# Patient Record
Sex: MALE | Race: AMERICAN INDIAN OR ALASKA NATIVE | NOT HISPANIC OR LATINO
[De-identification: names, ages, dates, MRNs, and addresses within clinical notes are randomized per-mention and may not be internally consistent; named-entity substitution may affect disease eponyms.]

---
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
## 2021-01-10 ENCOUNTER — EMERGENCY (EMERGENCY)
Facility: HOSPITAL | Age: 21
LOS: 1 days | Discharge: HOME | End: 2021-01-10
Attending: INTERNAL MEDICINE | Admitting: INTERNAL MEDICINE
Payer: COMMERCIAL

## 2021-01-10 VITALS
RESPIRATION RATE: 15 BRPM | HEART RATE: 88 BPM | TEMPERATURE: 98 F | OXYGEN SATURATION: 100 % | SYSTOLIC BLOOD PRESSURE: 103 MMHG | DIASTOLIC BLOOD PRESSURE: 64 MMHG

## 2021-01-10 VITALS
HEIGHT: 73 IN | TEMPERATURE: 98 F | HEART RATE: 104 BPM | RESPIRATION RATE: 16 BRPM | DIASTOLIC BLOOD PRESSURE: 78 MMHG | WEIGHT: 220.9 LBS | OXYGEN SATURATION: 100 % | SYSTOLIC BLOOD PRESSURE: 130 MMHG

## 2021-01-10 LAB
ALBUMIN SERPL ELPH-MCNC: 4 G/DL — SIGNIFICANT CHANGE UP (ref 3.3–5)
ALP SERPL-CCNC: 66 U/L — SIGNIFICANT CHANGE UP (ref 40–120)
ALT FLD-CCNC: 28 U/L — SIGNIFICANT CHANGE UP (ref 12–78)
ANION GAP SERPL CALC-SCNC: 8 MMOL/L — SIGNIFICANT CHANGE UP (ref 5–17)
APPEARANCE UR: CLEAR — SIGNIFICANT CHANGE UP
AST SERPL-CCNC: 19 U/L — SIGNIFICANT CHANGE UP (ref 15–37)
BASOPHILS # BLD AUTO: 0.02 K/UL — SIGNIFICANT CHANGE UP (ref 0–0.2)
BASOPHILS NFR BLD AUTO: 0.2 % — SIGNIFICANT CHANGE UP (ref 0–2)
BILIRUB SERPL-MCNC: 0.4 MG/DL — SIGNIFICANT CHANGE UP (ref 0.2–1.2)
BILIRUB UR-MCNC: NEGATIVE — SIGNIFICANT CHANGE UP
BUN SERPL-MCNC: 11 MG/DL — SIGNIFICANT CHANGE UP (ref 7–23)
CALCIUM SERPL-MCNC: 8.9 MG/DL — SIGNIFICANT CHANGE UP (ref 8.5–10.1)
CHLORIDE SERPL-SCNC: 104 MMOL/L — SIGNIFICANT CHANGE UP (ref 96–108)
CO2 SERPL-SCNC: 28 MMOL/L — SIGNIFICANT CHANGE UP (ref 22–31)
COLOR SPEC: SIGNIFICANT CHANGE UP
CREAT SERPL-MCNC: 0.83 MG/DL — SIGNIFICANT CHANGE UP (ref 0.5–1.3)
DIFF PNL FLD: NEGATIVE — SIGNIFICANT CHANGE UP
EOSINOPHIL # BLD AUTO: 0.06 K/UL — SIGNIFICANT CHANGE UP (ref 0–0.5)
EOSINOPHIL NFR BLD AUTO: 0.7 % — SIGNIFICANT CHANGE UP (ref 0–6)
GLUCOSE SERPL-MCNC: 95 MG/DL — SIGNIFICANT CHANGE UP (ref 70–99)
GLUCOSE UR QL: NEGATIVE — SIGNIFICANT CHANGE UP
HCT VFR BLD CALC: 45.3 % — SIGNIFICANT CHANGE UP (ref 39–50)
HGB BLD-MCNC: 15.4 G/DL — SIGNIFICANT CHANGE UP (ref 13–17)
IMM GRANULOCYTES NFR BLD AUTO: 0.4 % — SIGNIFICANT CHANGE UP (ref 0–1.5)
KETONES UR-MCNC: NEGATIVE — SIGNIFICANT CHANGE UP
LEUKOCYTE ESTERASE UR-ACNC: NEGATIVE — SIGNIFICANT CHANGE UP
LYMPHOCYTES # BLD AUTO: 0.65 K/UL — LOW (ref 1–3.3)
LYMPHOCYTES # BLD AUTO: 7.9 % — LOW (ref 13–44)
MCHC RBC-ENTMCNC: 29.7 PG — SIGNIFICANT CHANGE UP (ref 27–34)
MCHC RBC-ENTMCNC: 34 GM/DL — SIGNIFICANT CHANGE UP (ref 32–36)
MCV RBC AUTO: 87.3 FL — SIGNIFICANT CHANGE UP (ref 80–100)
MONOCYTES # BLD AUTO: 0.3 K/UL — SIGNIFICANT CHANGE UP (ref 0–0.9)
MONOCYTES NFR BLD AUTO: 3.6 % — SIGNIFICANT CHANGE UP (ref 2–14)
NEUTROPHILS # BLD AUTO: 7.18 K/UL — SIGNIFICANT CHANGE UP (ref 1.8–7.4)
NEUTROPHILS NFR BLD AUTO: 87.2 % — HIGH (ref 43–77)
NITRITE UR-MCNC: NEGATIVE — SIGNIFICANT CHANGE UP
NRBC # BLD: 0 /100 WBCS — SIGNIFICANT CHANGE UP (ref 0–0)
PH UR: 7 — SIGNIFICANT CHANGE UP (ref 5–8)
PLATELET # BLD AUTO: 202 K/UL — SIGNIFICANT CHANGE UP (ref 150–400)
POTASSIUM SERPL-MCNC: 4.2 MMOL/L — SIGNIFICANT CHANGE UP (ref 3.5–5.3)
POTASSIUM SERPL-SCNC: 4.2 MMOL/L — SIGNIFICANT CHANGE UP (ref 3.5–5.3)
PROT SERPL-MCNC: 7.9 G/DL — SIGNIFICANT CHANGE UP (ref 6–8.3)
PROT UR-MCNC: NEGATIVE — SIGNIFICANT CHANGE UP
RBC # BLD: 5.19 M/UL — SIGNIFICANT CHANGE UP (ref 4.2–5.8)
RBC # FLD: 12.4 % — SIGNIFICANT CHANGE UP (ref 10.3–14.5)
SARS-COV-2 RNA SPEC QL NAA+PROBE: SIGNIFICANT CHANGE UP
SODIUM SERPL-SCNC: 140 MMOL/L — SIGNIFICANT CHANGE UP (ref 135–145)
SP GR SPEC: 1.01 — SIGNIFICANT CHANGE UP (ref 1.01–1.02)
TROPONIN I SERPL-MCNC: <.015 NG/ML — SIGNIFICANT CHANGE UP (ref 0.01–0.04)
UROBILINOGEN FLD QL: NEGATIVE — SIGNIFICANT CHANGE UP
WBC # BLD: 8.24 K/UL — SIGNIFICANT CHANGE UP (ref 3.8–10.5)
WBC # FLD AUTO: 8.24 K/UL — SIGNIFICANT CHANGE UP (ref 3.8–10.5)

## 2021-01-10 PROCEDURE — 85025 COMPLETE CBC W/AUTO DIFF WBC: CPT

## 2021-01-10 PROCEDURE — 80053 COMPREHEN METABOLIC PANEL: CPT

## 2021-01-10 PROCEDURE — 84484 ASSAY OF TROPONIN QUANT: CPT

## 2021-01-10 PROCEDURE — 99283 EMERGENCY DEPT VISIT LOW MDM: CPT | Mod: 25

## 2021-01-10 PROCEDURE — 71045 X-RAY EXAM CHEST 1 VIEW: CPT | Mod: 26

## 2021-01-10 PROCEDURE — 93010 ELECTROCARDIOGRAM REPORT: CPT

## 2021-01-10 PROCEDURE — 99284 EMERGENCY DEPT VISIT MOD MDM: CPT

## 2021-01-10 PROCEDURE — 81003 URINALYSIS AUTO W/O SCOPE: CPT

## 2021-01-10 PROCEDURE — 71045 X-RAY EXAM CHEST 1 VIEW: CPT

## 2021-01-10 PROCEDURE — 93005 ELECTROCARDIOGRAM TRACING: CPT

## 2021-01-10 PROCEDURE — U0003: CPT

## 2021-01-10 PROCEDURE — 36415 COLL VENOUS BLD VENIPUNCTURE: CPT

## 2021-01-10 PROCEDURE — U0005: CPT

## 2021-01-10 RX ORDER — SODIUM CHLORIDE 9 MG/ML
1000 INJECTION INTRAMUSCULAR; INTRAVENOUS; SUBCUTANEOUS ONCE
Refills: 0 | Status: COMPLETED | OUTPATIENT
Start: 2021-01-10 | End: 2021-01-10

## 2021-01-10 RX ADMIN — SODIUM CHLORIDE 1000 MILLILITER(S): 9 INJECTION INTRAMUSCULAR; INTRAVENOUS; SUBCUTANEOUS at 16:53

## 2021-01-10 NOTE — ED PROVIDER NOTE - CARDIAC, MLM
Normal rate, regular rhythm.  Heart sounds S1, S2.  ++ audible murmurs, rubs or gallops. + well healed midline chest scar

## 2021-01-10 NOTE — ED ADULT NURSE NOTE - PMH
Cardiomegaly    Coarctation of aorta    Congenital heart disease    Pulmonic regurgitation    Septal defect, atrial    Shone complex

## 2021-01-10 NOTE — ED PROVIDER NOTE - PROGRESS NOTE DETAILS
case d/w Dr. coulter at [facility]. case d/w Dr. coulter at [facility] @ [phone] case d/w Dr. coulter at [facility], [place] @ [phone]. Per Dr. Coulter, patient was seen and eval sept 2020. Patient exam at that time WNL.  has arranged follow up with cardio clinic this Wednesday for evaluation.   Plan and results d/w patient who reports improvement of symptoms and will follow up with cardio this Wednesday.

## 2021-01-10 NOTE — ED PROVIDER NOTE - PATIENT PORTAL LINK FT
You can access the FollowMyHealth Patient Portal offered by [facility] by registering at the following website: [email or web address]. By joining [employer]’s FollowMyHealth portal, you will also be able to view your health information using other applications (apps) compatible with our system.

## 2021-01-10 NOTE — ED PROVIDER NOTE - ATTENDING CONTRIBUTION TO CARE
21 yo male present to ED c/o one episode of weakness that began today and lasted 5 minutes. Per patient he did not sleep well, felt tired durign the day and then went to lay down and felt weakness that lasted several minutes. Patient now feels well. No HA, vision changes. No neck or throat pain, denies fever, chills. Denies CP or SOB, denies palpitations. Denies abdominal pain, n/v/c/d. Denies urinary symptoms. Patient has OMHX Shones complex s/p surgery in 2017. s/p interrupted aortic arch repair, s/p repair of IAA, s/p subaortic stenosis repair, s/p patch closure of VSD, s/p pulmonary valvuloplasty, left pulmonary artery stenosis, coarctation of aorta. Follows with Cardio Dr. Mckenzie.  Heent nl neck sup heart rrr s1s2 lungs clear abd soft nt ext nl neuro nonfocal Dx general weakness, transient episodes labs xray ekg all normal case discussed with the patients cardiologist plan DC and f/u appointment on Wednesday, Patient instructed to return for symptoms or any changes

## 2021-01-10 NOTE — ED PROVIDER NOTE - WR INTERPRETATION 1
CXR negative - No pneumothorax, No opacities, No free airCXR negative - No infiltrates, No consolidation, No atelectasis seenCXR negative - No CHF, No cardiomegaly, No pleural effusionsCXR negative - No tracheal deviation, No pneumothorax, No subdiaphragm

## 2021-01-10 NOTE — ED PROVIDER NOTE - OBJECTIVE STATEMENT
Shones compexs/p interupted aortic arch repair, s/p repair of IAA, s/p subarotic stenosis repair, s/p patch closure of VSD, s/p pulmonary valvulopasty, left pulmonary artery steosisk, coarctationof aorta ki harrell 21 yo male present to ED c/o one episode of weakness that began today and lasted 5 minutes. Per patient he did not sleep well, felt tired durign the day and then went to lay down and felt weakness that lasted several minutes. Patient now feels well. No HA, vision changes. No neck or throat pain, denies fever, chills. Denies CP or SOB, denies palpitations. Denies abdominal pain, n/v/c/d. Denies urinary symptoms. Patient has OMHX Shones complex s/p surgery in 2017. s/p interrupted aortic arch repair, s/p repair of IAA, s/p subaortic stenosis repair, s/p patch closure of VSD, s/p pulmonary valvuloplasty, left pulmonary artery stenosis, coarctation of aorta. FOllows with Cardio Dr. Mckenzie. 21 yo male present to ED c/o one episode of weakness that began today and lasted 5 minutes. Per patient he did not sleep well, felt tired durign the day and then went to lay down and felt weakness that lasted several minutes. Patient now feels well. No HA, vision changes. No neck or throat pain, denies fever, chills. Denies CP or SOB, denies palpitations. Denies abdominal pain, n/v/c/d. Denies urinary symptoms. Patient has OMHX Shones complex s/p surgery in 2017. s/p interrupted aortic arch repair, s/p repair of IAA, s/p subaortic stenosis repair, s/p patch closure of VSD, s/p pulmonary valvuloplasty, left pulmonary artery stenosis, coarctation of aorta. Follows with Cardio Dr. Mckenzie.

## 2021-01-10 NOTE — ED PROVIDER NOTE - NSFOLLOWUPINSTRUCTIONS_ED_ALL_ED_FT
Follow up in the cardiology clinic for additional care. You have an appt set up on Wednesday, 1/13/2021.   Return to ED if symptoms return, chest pain, shortness of breath or any other concerns.

## 2021-01-10 NOTE — ED ADULT TRIAGE NOTE - PATIENT ON (OXYGEN DELIVERY METHOD)
No new care gaps identified.  Powered by [employer]. Reference number: [id number]. 9/02/2020 8:49:35 AM CDT  
room air

## 2021-01-10 NOTE — ED PROVIDER NOTE - CLINICAL SUMMARY MEDICAL DECISION MAKING FREE TEXT BOX
20 male with extensive cardiac history, labs, ekg, 20 male with extensive cardiac history, labs, ekg, cardio

## 2021-01-10 NOTE — ED PROVIDER NOTE - MUSCULOSKELETAL NEGATIVE STATEMENT, MLM
no back pain, no gout, no musculoskeletal pain, no neck pain, and ++ generalized weakness, now resolved.

## 2021-04-01 ENCOUNTER — EMERGENCY (EMERGENCY)
Facility: HOSPITAL | Age: 21
LOS: 1 days | Discharge: HOME | End: 2021-04-01
Attending: EMERGENCY MEDICINE | Admitting: EMERGENCY MEDICINE
Payer: COMMERCIAL

## 2021-04-01 VITALS — RESPIRATION RATE: 16 BRPM | DIASTOLIC BLOOD PRESSURE: 73 MMHG | SYSTOLIC BLOOD PRESSURE: 102 MMHG | HEART RATE: 99 BPM

## 2021-04-01 VITALS
DIASTOLIC BLOOD PRESSURE: 78 MMHG | RESPIRATION RATE: 18 BRPM | HEART RATE: 104 BPM | HEIGHT: 73 IN | SYSTOLIC BLOOD PRESSURE: 131 MMHG | OXYGEN SATURATION: 100 % | WEIGHT: 229.94 LBS | TEMPERATURE: 97 F

## 2021-04-01 LAB
ALBUMIN SERPL ELPH-MCNC: 3.6 G/DL — SIGNIFICANT CHANGE UP (ref 3.3–5)
ALP SERPL-CCNC: 60 U/L — SIGNIFICANT CHANGE UP (ref 40–120)
ALT FLD-CCNC: 23 U/L — SIGNIFICANT CHANGE UP (ref 12–78)
ANION GAP SERPL CALC-SCNC: 5 MMOL/L — SIGNIFICANT CHANGE UP (ref 5–17)
APTT BLD: 35.3 SEC — SIGNIFICANT CHANGE UP (ref 27.5–35.5)
AST SERPL-CCNC: 10 U/L — LOW (ref 15–37)
BASOPHILS # BLD AUTO: 0.02 K/UL — SIGNIFICANT CHANGE UP (ref 0–0.2)
BASOPHILS NFR BLD AUTO: 0.3 % — SIGNIFICANT CHANGE UP (ref 0–2)
BILIRUB SERPL-MCNC: 0.3 MG/DL — SIGNIFICANT CHANGE UP (ref 0.2–1.2)
BUN SERPL-MCNC: 14 MG/DL — SIGNIFICANT CHANGE UP (ref 7–23)
CALCIUM SERPL-MCNC: 8.6 MG/DL — SIGNIFICANT CHANGE UP (ref 8.5–10.1)
CHLORIDE SERPL-SCNC: 105 MMOL/L — SIGNIFICANT CHANGE UP (ref 96–108)
CO2 SERPL-SCNC: 29 MMOL/L — SIGNIFICANT CHANGE UP (ref 22–31)
CREAT SERPL-MCNC: 0.82 MG/DL — SIGNIFICANT CHANGE UP (ref 0.5–1.3)
EOSINOPHIL # BLD AUTO: 0.18 K/UL — SIGNIFICANT CHANGE UP (ref 0–0.5)
EOSINOPHIL NFR BLD AUTO: 2.8 % — SIGNIFICANT CHANGE UP (ref 0–6)
GLUCOSE SERPL-MCNC: 90 MG/DL — SIGNIFICANT CHANGE UP (ref 70–99)
HCT VFR BLD CALC: 42.4 % — SIGNIFICANT CHANGE UP (ref 39–50)
HGB BLD-MCNC: 14.3 G/DL — SIGNIFICANT CHANGE UP (ref 13–17)
IMM GRANULOCYTES NFR BLD AUTO: 0.2 % — SIGNIFICANT CHANGE UP (ref 0–1.5)
INR BLD: 1.23 RATIO — HIGH (ref 0.88–1.16)
LYMPHOCYTES # BLD AUTO: 0.69 K/UL — LOW (ref 1–3.3)
LYMPHOCYTES # BLD AUTO: 10.7 % — LOW (ref 13–44)
MCHC RBC-ENTMCNC: 29.4 PG — SIGNIFICANT CHANGE UP (ref 27–34)
MCHC RBC-ENTMCNC: 33.7 GM/DL — SIGNIFICANT CHANGE UP (ref 32–36)
MCV RBC AUTO: 87.2 FL — SIGNIFICANT CHANGE UP (ref 80–100)
MONOCYTES # BLD AUTO: 0.4 K/UL — SIGNIFICANT CHANGE UP (ref 0–0.9)
MONOCYTES NFR BLD AUTO: 6.2 % — SIGNIFICANT CHANGE UP (ref 2–14)
NEUTROPHILS # BLD AUTO: 5.17 K/UL — SIGNIFICANT CHANGE UP (ref 1.8–7.4)
NEUTROPHILS NFR BLD AUTO: 79.8 % — HIGH (ref 43–77)
NRBC # BLD: 0 /100 WBCS — SIGNIFICANT CHANGE UP (ref 0–0)
PLATELET # BLD AUTO: 167 K/UL — SIGNIFICANT CHANGE UP (ref 150–400)
POTASSIUM SERPL-MCNC: 4 MMOL/L — SIGNIFICANT CHANGE UP (ref 3.5–5.3)
POTASSIUM SERPL-SCNC: 4 MMOL/L — SIGNIFICANT CHANGE UP (ref 3.5–5.3)
PROT SERPL-MCNC: 7.6 G/DL — SIGNIFICANT CHANGE UP (ref 6–8.3)
PROTHROM AB SERPL-ACNC: 14.3 SEC — HIGH (ref 10.6–13.6)
RBC # BLD: 4.86 M/UL — SIGNIFICANT CHANGE UP (ref 4.2–5.8)
RBC # FLD: 12.5 % — SIGNIFICANT CHANGE UP (ref 10.3–14.5)
SODIUM SERPL-SCNC: 139 MMOL/L — SIGNIFICANT CHANGE UP (ref 135–145)
TROPONIN I SERPL-MCNC: <.015 NG/ML — SIGNIFICANT CHANGE UP (ref 0.01–0.04)
TSH SERPL-MCNC: 0.64 UIU/ML — SIGNIFICANT CHANGE UP (ref 0.36–3.74)
WBC # BLD: 6.47 K/UL — SIGNIFICANT CHANGE UP (ref 3.8–10.5)
WBC # FLD AUTO: 6.47 K/UL — SIGNIFICANT CHANGE UP (ref 3.8–10.5)

## 2021-04-01 PROCEDURE — 93010 ELECTROCARDIOGRAM REPORT: CPT

## 2021-04-01 PROCEDURE — 84443 ASSAY THYROID STIM HORMONE: CPT

## 2021-04-01 PROCEDURE — 36415 COLL VENOUS BLD VENIPUNCTURE: CPT

## 2021-04-01 PROCEDURE — 80053 COMPREHEN METABOLIC PANEL: CPT

## 2021-04-01 PROCEDURE — 99284 EMERGENCY DEPT VISIT MOD MDM: CPT | Mod: 25

## 2021-04-01 PROCEDURE — 85025 COMPLETE CBC W/AUTO DIFF WBC: CPT

## 2021-04-01 PROCEDURE — 71045 X-RAY EXAM CHEST 1 VIEW: CPT | Mod: 26

## 2021-04-01 PROCEDURE — 71045 X-RAY EXAM CHEST 1 VIEW: CPT

## 2021-04-01 PROCEDURE — 99284 EMERGENCY DEPT VISIT MOD MDM: CPT

## 2021-04-01 PROCEDURE — 93005 ELECTROCARDIOGRAM TRACING: CPT

## 2021-04-01 PROCEDURE — 84484 ASSAY OF TROPONIN QUANT: CPT

## 2021-04-01 PROCEDURE — 85730 THROMBOPLASTIN TIME PARTIAL: CPT

## 2021-04-01 PROCEDURE — 99285 EMERGENCY DEPT VISIT HI MDM: CPT

## 2021-04-01 PROCEDURE — 85610 PROTHROMBIN TIME: CPT

## 2021-04-01 NOTE — ED ADULT NURSE NOTE - PMH
Cardiomegaly    Coarctation of aorta    Congenital heart disease    No pertinent past medical history    Pulmonic regurgitation    Septal defect, atrial    Shone complex

## 2021-04-01 NOTE — CONSULT NOTE ADULT - ASSESSMENT
Assessment/Plan:  19 y/o M with Hx Shone's Complex consisting of coarctation of aorta, ASD, PVR s/p uninterrupted repair of the aortic arch, s/p subaortic stenosis repair, s/p patch closure of VSD, and s/p pulmonary valvuloplasty as a  with a reop in  (unknown procedure) presented to the ED c/o palpitations which has been resolved.  Follows closely with his private Cardiologist from [facility], Dr. Mckenzie.  Had seen rose in February, had a TTE that was reportedly normal.  Denies having had any episode of palpitations in the past and is not on any AVN blocker.    Palpitations  - Felt palpitations this morning that happened at rest  - He has a known Hx of congenital heart disease (Shone's complex) and had extensive surgery with a reop in   - Follows closely with his private cardiologist in [facility] whom he had seen recently.  He has been asymptomatic except today, c/o palpitations, which has been resolved  - Please, obtain EKG and attach to bedside monitor while in ED  - If no evidence of tachycardia/AVNRT/Afib, can be discharged from cardiac standpoint and to follow up with Dr. Mckenzie    If admitted, will follow    Joanna Rodriguez DNP, NP-C  Cardiology  Spectra #[phone]       Assessment/Plan:  21 y/o M with Hx Shone's Complex consisting of coarctation of aorta, ASD, PVR s/p uninterrupted repair of the aortic arch, s/p subaortic stenosis repair, s/p patch closure of VSD, and s/p pulmonary valvuloplasty as a  with a reop in  (unknown procedure) presented to the ED c/o palpitations which has been resolved.  Follows closely with his private Cardiologist from [facility], Dr. Mckenzie.  Had seen rose in February, had a TTE that was reportedly normal.  Denies having had any episode of palpitations in the past and is not on any AVN blocker.    Palpitations  - Felt palpitations this morning that happened at rest  - He has a known Hx of congenital heart disease (Shone's complex) and had extensive surgery with a reop in   - Follows closely with his private cardiologist in [facility] whom he had seen recently.  He has been asymptomatic except today, c/o palpitations, which has been resolved  - Please, obtain EKG and attach to bedside monitor while in ED  - Add TSH with labs  - If no evidence of tachycardia/AVNRT/Afib, can be discharged from cardiac standpoint and to follow up with Dr. Mckenzie    If admitted, will follow    Joanna Rodriguez DNP, NP-C  Cardiology  Spectra #[phone]

## 2021-04-01 NOTE — ED PROVIDER NOTE - PATIENT PORTAL LINK FT
You can access the FollowMyHealth Patient Portal offered by [facility] by registering at the following website: [email or web address]. By joining [employer]’s FollowMyHealth portal, you will also be able to view your health information using other applications (apps) compatible with our system.

## 2021-04-01 NOTE — CONSULT NOTE ADULT - ATTENDING COMMENTS
I personally saw and examined the patient in detail.  I have spoken to the above provider regarding the assessment and plan of care.  I reviewed the above assessment and plan of care, and agree.  I have made changes in the body of the note where appropriate.  Monitor on telemetry two hours in ED.  If no arrhythmia, to follow as outpatient for Zio patch.

## 2021-04-01 NOTE — ED PROVIDER NOTE - ATTENDING CONTRIBUTION TO CARE
19 yo M with childhood cardiac issues (Cardiomegaly, coartication aorta) p/w palpitations today. no chesty pain. no sob. no numb/ting/focal weak. no neck / back pain. no numb/ting/focal weak. no agg/allev factors. no recent trauma. no recent illness. no other acute co.  No known covid exposure, no immuno / prior infxn.  exam: MM Moist. neck supple. no meningeal signs. abd soft NT. no hsm. no cvat. non-toxic, well appearing. Nl cardiac / resp exam. no c/c/e. no other acute findings  check labs, xr, dimer, cardio

## 2021-04-01 NOTE — CONSULT NOTE ADULT - SUBJECTIVE AND OBJECTIVE BOX
[facility] - Madison Santana, Tess Owens, Julius, Fady, Estrella, Oumou  Office Number: [phone]    Initial Consult Note:     CHIEF COMPLAINT: Patient is a 20y old  Male who presents with a chief complaint of     HPI:  This is a 21 y/o M with Hx Shone's Complex consisting of coarctation of aorta, ASD, PVR s/p uninterrupted repair of the aortic arch, s/p subaortic stenosis repair, s/p patch closure of VSD, and s/p pulmonary valvuloplasty presented to the ED c/p palpitation lasting     PAST MEDICAL & SURGICAL HISTORY:  No pertinent past medical history    Cardiomegaly    Pulmonic regurgitation    Congenital heart disease    Shone complex    Septal defect, atrial    Coarctation of aorta    SOCIAL HISTORY:  No tobacco, ethanol, or drug abuse.  FAMILY HISTORY:    No family history of acute MI or sudden cardiac death.  MEDICATIONS  (STANDING):    MEDICATIONS  (PRN):    Allergies    Augmentin (Rash)  Augmentin (Unknown)    Intolerances    REVIEW OF SYSTEMS:  CONSTITUTIONAL: No weakness, fevers or chills  EYES/ENT: No visual changes;  No vertigo or throat pain   NECK: No pain or stiffness  RESPIRATORY: No cough, wheezing, hemoptysis; No shortness of breath  CARDIOVASCULAR: No chest pain or palpitations  GASTROINTESTINAL: No abdominal pain. No nausea, vomiting, or hematemesis; No diarrhea or constipation. No melena or hematochezia.  GENITOURINARY: No dysuria, frequency or hematuria  NEUROLOGICAL: No numbness or weakness  SKIN: No itching or rash  All other review of systems is negative unless indicated above  VITAL SIGNS:   Vital Signs Last 24 Hrs  T(C): 36.3 (01 Apr 2021 11:47), Max: 36.3 (01 Apr 2021 11:47)  T(F): 97.4 (01 Apr 2021 11:47), Max: 97.4 (01 Apr 2021 11:47)  HR: 104 (01 Apr 2021 11:47) (104 - 104)  BP: 131/78 (01 Apr 2021 11:47) (131/78 - 131/78)  BP(mean): --  RR: 18 (01 Apr 2021 11:47) (18 - 18)  SpO2: 100% (01 Apr 2021 11:47) (100% - 100%)  I&O's Summary    On Exam:  Constitutional: NAD, alert and oriented x 3  Lungs:  Non-labored, breath sounds are clear bilaterally, No wheezing, rales or rhonchi  Cardiovascular: RRR.  S1 and S2 positive.  No murmurs, rubs, gallops or clicks  Gastrointestinal: Bowel Sounds present, soft, nontender.   Lymph: No peripheral edema. No cervical lymphadenopathy.  Neurological: Alert, no focal deficits  Skin: No rashes or ulcers   Psych:  Mood & affect appropriate.    LABS: All Labs Reviewed:                        14.3   6.47  )-----------( 167      ( 01 Apr 2021 13:02 )             42.4       EXAM:  XR CHEST PORTABLE URGENT 1V                          PROCEDURE DATE:  01/10/2021      INTERPRETATION:  AP semierect chest on January 10, 2021 at 4:46 PM. Patient has weakness in an extensive cardiac history.    COMPARISON: None available. Heart is normal for projection.    Sternotomy is noted.    The lung fields and pleural surfaces are unremarkable.    IMPRESSION: Sternotomy.            MAKEDA BEAUCHAMP M.D., ATTENDING RADIOLOGIST  This document has been electronically signed. Jan 112021  8:10AM              Blood Culture:         RADIOLOGY:    EKG:    Assessment/Plan:  20y Male    Joanna Jennifer DNP, ANP-C  Cardiology  Spectra #[phone]       [facility] - Madison Santana, Tess Owens, Julius, Fady, Estrella, Oumou  Office Number: [phone]    Initial Consult Note: 21 y/o M with Hx Shone's Complex consisting of coarctation of aorta, ASD, PVR s/p uninterrupted repair of the aortic arch, s/p subaortic stenosis repair, s/p patch closure of VSD, and s/p pulmonary valvuloplasty as a  with a reop in  (unknown procedure) presented to the ED c/o palpitations which has been resolved.    CHIEF COMPLAINT: Patient is a 20y old  Male who presents with a chief complaint of     HPI:  This is a 21 y/o M with Hx Shone's Complex consisting of coarctation of aorta, ASD, PVR s/p uninterrupted repair of the aortic arch, s/p subaortic stenosis repair, s/p patch closure of VSD, and s/p pulmonary valvuloplasty as a  with a reop in  (unknown procedure) presented to the ED c/o palpitations lasting 2-4 mins for at least 4 x since this morning after drinking tea.  Denies dizziness, lightheadedness, SOB, MARQUEZ, orthopnea, edema, chest pain.  Denies change in appetite, admits to be hydrating himself well.  Denies fevers, chills, diarrhea, sick contact , or recent travel.    Follows closely with his private Cardiologist from [facility], Dr. Mckenzie.  Had seen rose in February, had a TTE that was reportedly normal.  Denies having had any episode of palpitations in the past and is not on any AVN blocker.  On evaluation, patient is completely asymptomatic.  Available labs are unremarkable.  EKG pending.  Chest Xray negative of any acute findings    PAST MEDICAL & SURGICAL HISTORY:  No pertinent past medical history    Cardiomegaly    Pulmonic regurgitation    Congenital heart disease    Shone complex    Septal defect, atrial    Coarctation of aorta    SOCIAL HISTORY:  No tobacco, ethanol, or drug abuse.  FAMILY HISTORY:    No family history of acute MI or sudden cardiac death.  MEDICATIONS  (STANDING):    MEDICATIONS  (PRN):    Allergies    Augmentin (Rash)  Augmentin (Unknown)    Intolerances    REVIEW OF SYSTEMS:  CONSTITUTIONAL: No weakness, fevers or chills  EYES/ENT: No visual changes;  No vertigo or throat pain   NECK: No pain or stiffness  RESPIRATORY: No cough, wheezing, hemoptysis; No shortness of breath  CARDIOVASCULAR: No chest pain + palpitations  GASTROINTESTINAL: No abdominal pain. No nausea, vomiting, or hematemesis; No diarrhea or constipation. No melena or hematochezia.  GENITOURINARY: No dysuria, frequency or hematuria  NEUROLOGICAL: No numbness or weakness  SKIN: No itching or rash  All other review of systems is negative unless indicated above  VITAL SIGNS:   Vital Signs Last 24 Hrs  T(C): 36.3 (2021 11:47), Max: 36.3 (2021 11:47)  T(F): 97.4 (2021 11:47), Max: 97.4 (2021 11:47)  HR: 104 (2021 11:47) (104 - 104)  BP: 131/78 (2021 11:47) (131/78 - 131/78)  BP(mean): --  RR: 18 (2021 11:47) (18 - 18)  SpO2: 100% (2021 11:47) (100% - 100%)  I&O's Summary    On Exam:  Constitutional: NAD, alert and oriented x 3  Lungs:  Non-labored, breath sounds are clear bilaterally, No wheezing, rales or rhonchi  Cardiovascular: RRR.  S1 and S2 positive.  + murmurs, no rubs, gallops or clicks  Gastrointestinal: Bowel Sounds present, soft, nontender.   Lymph: No peripheral edema. No cervical lymphadenopathy.  Neurological: Alert, no focal deficits  Skin: No rashes or ulcers   Psych:  Mood & affect appropriate.    LABS: All Labs Reviewed:                        14.3   6.47  )-----------( 167      ( 2021 13:02 )             42.4     RADIOLOGY:  EXAM:  XR CHEST PORTABLE URGENT 1V                          PROCEDURE DATE:  01/10/2021      INTERPRETATION:  AP semierect chest on January 10, 2021 at 4:46 PM. Patient has weakness in an extensive cardiac history.    COMPARISON: None available. Heart is normal for projection.    Sternotomy is noted.    The lung fields and pleural surfaces are unremarkable.    IMPRESSION: Sternotomy.    MAKEDA D WECK M.D., ATTENDING RADIOLOGIST  This document has been electronically signed. 1120  8:10A    EKG:

## 2021-04-01 NOTE — ED ADULT NURSE NOTE - OBJECTIVE STATEMENT
pt to er states he had palpitations today denies pain is alert oriented speech clear denies sob denies fever skin intact warm and dry

## 2021-04-01 NOTE — ED PROVIDER NOTE - PROGRESS NOTE DETAILS
Spoke with cardiologist, Dr. Madrid, will see pt in ED for consult Pt seen by Dr. Madrid in ED, advised to monitor pt in ED and if no tachycardia/AVNRT/Afib and be discharged home for f/u with cards for holter monitor Pt in NAD in ED, pt asymptomatic, has not any symptoms of palpitations while in ED, heart rate in low 90s, no other symptoms. pt comfortable and eating lunch in ED. Will d/c home and f/u cardiologist.  Reevaluated patient at bedside.  Patient feeling much improved.  Discussed the results of all diagnostic testing in ED and copies of all reports given.   An opportunity to ask questions was given.  Discussed the importance of prompt, close medical follow-up.  Patient will return with any changes, concerns or persistent / worsening symptoms.  Understanding of all instructions verbalized.

## 2021-04-01 NOTE — ED PROVIDER NOTE - CPE EDP EYES NORM
Kelly Figueroa is a 65 y.o. female.     Pt is here to follow up depression and obesity  Weaned off effexor and started trintellix  Doesn't seem to have helped weight  Mood seems to be doing fine on trintellix, though  And not having any trouble with adverse effects  She does note that she often comfort eats  Eating even when she's not hungry  And often gets fatigued of diet plans    No chest pain, soa  No abd pain, n/v/d  No fever, cough         The following portions of the patient's history were reviewed and updated as appropriate: allergies, current medications, past family history, past medical history, past social history, past surgical history and problem list.    Review of Systems   Constitutional: Positive for fatigue. Negative for fever.   HENT: Negative for congestion, ear pain, rhinorrhea and sore throat.    Eyes: Negative for blurred vision and itching.   Respiratory: Negative for cough and shortness of breath.    Cardiovascular: Negative for chest pain and palpitations.   Gastrointestinal: Negative for abdominal pain, diarrhea and vomiting.   Endocrine: Negative for polydipsia and polyuria.   Genitourinary: Negative for dysuria, frequency, hematuria and urgency.   Musculoskeletal: Positive for arthralgias and myalgias. Negative for joint swelling.   Skin: Negative for rash and skin lesions.   Neurological: Negative for dizziness, numbness and headache.   Psychiatric/Behavioral: Positive for depressed mood and stress. The patient is nervous/anxious.          Current Outpatient Medications:   •  amLODIPine (NORVASC) 5 MG tablet, TAKE 1 TABLET BY MOUTH DAILY, Disp: 90 tablet, Rfl: 1  •  aspirin 81 MG EC tablet, Take 81 mg by mouth Daily., Disp: , Rfl:   •  Biotin 1000 MCG tablet, Take 1,000 mcg by mouth Daily., Disp: , Rfl:   •  calcium (OS-SONNY) 600 MG tablet, Take 1,200 mg by mouth Daily., Disp: , Rfl:   •  CRANBERRY PO, Take 1 tablet by mouth Daily., Disp: , Rfl:   •  levothyroxine  "(SYNTHROID, LEVOTHROID) 112 MCG tablet, TAKE 1 TABLET BY MOUTH DAILY, Disp: 90 tablet, Rfl: 1  •  Multiple Vitamins-Minerals (DAILY MULTIVITAMIN) capsule, Take 1 tablet by mouth Daily., Disp: , Rfl:   •  Omega-3 Fatty Acids ([employer] FISH OIL) 1200 MG capsule, Take 2,400 mg by mouth Daily., Disp: , Rfl:   •  polyethylene glycol (MIRALAX) packet, Take 17 g by mouth Daily., Disp: , Rfl:   •  Probiotic Product ([employer] PROBIOTIC PEARLS EX ST) capsule, Take 1 tablet by mouth Daily. Pt to stop taking on 2/3 for surgery, Disp: , Rfl:   •  vitamin C (ASCORBIC ACID) 500 MG tablet, Take 1,000 mg by mouth Daily. Do not take after 2/3 for surgery, Disp: , Rfl:   •  vitamin d (DIALYVITE VITAMIN D 5000) 5000 units capsule, Take 1 tablet by mouth Daily., Disp: , Rfl:     Objective   /78 (BP Location: Left arm, Patient Position: Sitting, Cuff Size: Large Adult)   Pulse 85   Temp 97.7 °F (36.5 °C) (Temporal)   Resp 16   Ht 160 cm (63\")   Wt 89.6 kg (197 lb 9.6 oz)   SpO2 100%   Breastfeeding No   BMI 35.00 kg/m²   Physical Exam  Vitals signs reviewed.   Constitutional:       General: She is not in acute distress.     Appearance: She is well-developed. She is not diaphoretic.   HENT:      Head: Normocephalic and atraumatic.      Right Ear: External ear normal.      Left Ear: External ear normal.      Nose: Nose normal.      Mouth/Throat:      Pharynx: No oropharyngeal exudate.   Eyes:      General: No scleral icterus.        Right eye: No discharge.         Left eye: No discharge.      Conjunctiva/sclera: Conjunctivae normal.   Neck:      Musculoskeletal: Normal range of motion and neck supple.      Thyroid: No thyromegaly.   Cardiovascular:      Rate and Rhythm: Normal rate and regular rhythm.      Heart sounds: Normal heart sounds. No murmur. No friction rub. No gallop.    Pulmonary:      Effort: Pulmonary effort is normal. No respiratory distress.      Breath sounds: Normal breath sounds. No wheezing or rales. "   Musculoskeletal: Normal range of motion.         General: No deformity.   Lymphadenopathy:      Cervical: No cervical adenopathy.   Skin:     General: Skin is warm and dry.      Findings: No erythema or rash.   Neurological:      Mental Status: She is alert and oriented to person, place, and time.      Cranial Nerves: No cranial nerve deficit.   Psychiatric:         Behavior: Behavior normal.         Thought Content: Thought content normal.           Assessment/Plan   Problems Addressed this Visit        Nervous and Auditory    Arthralgia       Other    Depression    Relevant Medications    phentermine (ADIPEX-P) 37.5 MG tablet    venlafaxine XR (Effexor XR) 75 MG 24 hr capsule      Other Visit Diagnoses     Class 2 obesity without serious comorbidity with body mass index (BMI) of 35.0 to 35.9 in adult, unspecified obesity type    -  Primary    Relevant Medications    phentermine (ADIPEX-P) 37.5 MG tablet    Myalgia          Diagnoses       Codes Comments    Class 2 obesity without serious comorbidity with body mass index (BMI) of 35.0 to 35.9 in adult, unspecified obesity type    -  Primary ICD-10-CM: E66.9, Z68.35  ICD-9-CM: 278.00, V85.35     Recurrent major depressive disorder, in partial remission (CMS/[facility])     ICD-10-CM: F33.41  ICD-9-CM: 296.35     Arthralgia, unspecified joint     ICD-10-CM: M25.50  ICD-9-CM: 719.40     Myalgia     ICD-10-CM: M79.10  ICD-9-CM: 729.1           Dc trintellix since it doesn't seem to help more than effexor, isn't improving weight, and would be more costly  Restart effexor but at 75mg  She's been having more joint and muscle pain - perhaps from fibromyalgia or perhaps from weight gain  effexor may have been helping if it were fibro  Start phentermine  rtc 4-6 weeks to follow up on weight and mood and arthralgias/myalgias         Procedures   normal...

## 2021-04-01 NOTE — ED PROVIDER NOTE - OBJECTIVE STATEMENT
19 y/o M with hx of coarctation of aorta, atrial septal defect, pulmonary regurgitation, cardiomegaly, congential heart disease presents with c/o palpitations x today. States that he has intermittent palpitations since this morning. States that palpitations lasts 2 seconds and then goes back to normal, last felt palpitations 10 minutes ago. Denies any symptoms at this time. Pt denies fever, chest pain, shortness of breath, irregular heart beat, leg pain/swelling, dizziness, N/V, diaphoresis, cough, recent travel, sick contacts or other symptoms.  cards in [place] 21 y/o M with hx of coarctation of aorta, atrial septal defect, pulmonary regurgitation, cardiomegaly, congential heart disease presents with c/o palpitations x today. States that he has intermittent palpitations since this morning. States that palpitations lasts ~2-4 minutes and then goes back to normal, feels improved with rest, last felt palpitations 10 minutes ago. Took a dose of non-drowsy claritin this am for allergies. Denies any symptoms at this time. Pt denies fever, chest pain, shortness of breath, irregular heart beat, leg pain/swelling, dizziness, N/V, diaphoresis, cough, recent travel, sick contacts, caffeine intake or other symptoms.  cardiologist in [facility] 19 y/o M with hx of shone's complex with coarctation of aorta, atrial septal defect, pulmonary regurgitation, cardiomegaly, congential heart disease presents with c/o palpitations x today. States that he has intermittent palpitations since this morning. States that palpitations lasts ~2-4 minutes and then goes back to normal, feels improved with rest, last felt palpitations 10 minutes ago. Took a dose of non-drowsy claritin this am for allergies. Denies any symptoms at this time. States that he saw his cardiologist in February this year and had a TTE. Pt denies fever, chest pain, shortness of breath, irregular heart beat, leg pain/swelling, dizziness, N/V, diaphoresis, cough, recent travel, sick contacts, caffeine intake or other symptoms.  cardiologist in [facility]

## 2021-04-01 NOTE — ED PROVIDER NOTE - CLINICAL SUMMARY MEDICAL DECISION MAKING FREE TEXT BOX
21 yo M with hx of cardiomegaly, congenital heart disease, coarctation of aorta, pulmonary regurgitation c/o intermittent palpitations since this am, no cp/sob/fever/dizziness/n/v/diaphoresis/abdominal pain; asymptomatic at this time in ED, will get ekg, labs, cards consult, reassess

## 2021-04-01 NOTE — ED PROVIDER NOTE - CARE PROVIDER_API CALL
Tee Madrid)  Cardio [facility]  [address] [place] [address]  Phone: [phone]  Fax: [phone]  Follow Up Time: 1-3 Days

## 2021-04-02 PROBLEM — I51.7 CARDIOMEGALY: Chronic | Status: ACTIVE | Noted: 2021-01-10

## 2021-04-02 PROBLEM — Q24.9 CONGENITAL MALFORMATION OF HEART, UNSPECIFIED: Chronic | Status: ACTIVE | Noted: 2021-01-10

## 2021-04-02 PROBLEM — Q21.1 ATRIAL SEPTAL DEFECT: Chronic | Status: ACTIVE | Noted: 2021-01-10

## 2021-04-02 PROBLEM — Q25.1 COARCTATION OF AORTA: Chronic | Status: ACTIVE | Noted: 2021-01-10

## 2021-04-02 PROBLEM — I37.1 NONRHEUMATIC PULMONARY VALVE INSUFFICIENCY: Chronic | Status: ACTIVE | Noted: 2021-01-10

## 2021-04-16 ENCOUNTER — EMERGENCY (EMERGENCY)
Facility: HOSPITAL | Age: 21
LOS: 1 days | Discharge: HOME | End: 2021-04-16
Attending: EMERGENCY MEDICINE | Admitting: EMERGENCY MEDICINE
Payer: COMMERCIAL

## 2021-04-16 VITALS
DIASTOLIC BLOOD PRESSURE: 61 MMHG | SYSTOLIC BLOOD PRESSURE: 122 MMHG | HEART RATE: 96 BPM | OXYGEN SATURATION: 100 % | RESPIRATION RATE: 18 BRPM | TEMPERATURE: 99 F

## 2021-04-16 VITALS
WEIGHT: 179.9 LBS | HEART RATE: 97 BPM | OXYGEN SATURATION: 96 % | SYSTOLIC BLOOD PRESSURE: 111 MMHG | RESPIRATION RATE: 18 BRPM | TEMPERATURE: 98 F | DIASTOLIC BLOOD PRESSURE: 78 MMHG | HEIGHT: 73 IN

## 2021-04-16 LAB
ALBUMIN SERPL ELPH-MCNC: 3.9 G/DL — SIGNIFICANT CHANGE UP (ref 3.3–5)
ALP SERPL-CCNC: 64 U/L — SIGNIFICANT CHANGE UP (ref 40–120)
ALT FLD-CCNC: 16 U/L — SIGNIFICANT CHANGE UP (ref 12–78)
ANION GAP SERPL CALC-SCNC: 6 MMOL/L — SIGNIFICANT CHANGE UP (ref 5–17)
APTT BLD: 36.8 SEC — HIGH (ref 27.5–35.5)
AST SERPL-CCNC: 12 U/L — LOW (ref 15–37)
BASOPHILS # BLD AUTO: 0.01 K/UL — SIGNIFICANT CHANGE UP (ref 0–0.2)
BASOPHILS NFR BLD AUTO: 0.1 % — SIGNIFICANT CHANGE UP (ref 0–2)
BILIRUB SERPL-MCNC: 0.4 MG/DL — SIGNIFICANT CHANGE UP (ref 0.2–1.2)
BUN SERPL-MCNC: 14 MG/DL — SIGNIFICANT CHANGE UP (ref 7–23)
CALCIUM SERPL-MCNC: 8.7 MG/DL — SIGNIFICANT CHANGE UP (ref 8.5–10.1)
CHLORIDE SERPL-SCNC: 106 MMOL/L — SIGNIFICANT CHANGE UP (ref 96–108)
CO2 SERPL-SCNC: 28 MMOL/L — SIGNIFICANT CHANGE UP (ref 22–31)
CREAT SERPL-MCNC: 0.91 MG/DL — SIGNIFICANT CHANGE UP (ref 0.5–1.3)
D DIMER BLD IA.RAPID-MCNC: <150 NG/ML DDU — SIGNIFICANT CHANGE UP
EOSINOPHIL # BLD AUTO: 0.17 K/UL — SIGNIFICANT CHANGE UP (ref 0–0.5)
EOSINOPHIL NFR BLD AUTO: 2.4 % — SIGNIFICANT CHANGE UP (ref 0–6)
GLUCOSE SERPL-MCNC: 88 MG/DL — SIGNIFICANT CHANGE UP (ref 70–99)
HCT VFR BLD CALC: 45 % — SIGNIFICANT CHANGE UP (ref 39–50)
HGB BLD-MCNC: 15.4 G/DL — SIGNIFICANT CHANGE UP (ref 13–17)
IMM GRANULOCYTES NFR BLD AUTO: 0.3 % — SIGNIFICANT CHANGE UP (ref 0–1.5)
INR BLD: 1.33 RATIO — HIGH (ref 0.88–1.16)
LYMPHOCYTES # BLD AUTO: 0.62 K/UL — LOW (ref 1–3.3)
LYMPHOCYTES # BLD AUTO: 8.7 % — LOW (ref 13–44)
MAGNESIUM SERPL-MCNC: 1.9 MG/DL — SIGNIFICANT CHANGE UP (ref 1.6–2.6)
MCHC RBC-ENTMCNC: 29.7 PG — SIGNIFICANT CHANGE UP (ref 27–34)
MCHC RBC-ENTMCNC: 34.2 GM/DL — SIGNIFICANT CHANGE UP (ref 32–36)
MCV RBC AUTO: 86.7 FL — SIGNIFICANT CHANGE UP (ref 80–100)
MONOCYTES # BLD AUTO: 0.38 K/UL — SIGNIFICANT CHANGE UP (ref 0–0.9)
MONOCYTES NFR BLD AUTO: 5.3 % — SIGNIFICANT CHANGE UP (ref 2–14)
NEUTROPHILS # BLD AUTO: 5.91 K/UL — SIGNIFICANT CHANGE UP (ref 1.8–7.4)
NEUTROPHILS NFR BLD AUTO: 83.2 % — HIGH (ref 43–77)
NRBC # BLD: 0 /100 WBCS — SIGNIFICANT CHANGE UP (ref 0–0)
NT-PROBNP SERPL-SCNC: 24 PG/ML — SIGNIFICANT CHANGE UP (ref 0–125)
PLATELET # BLD AUTO: 184 K/UL — SIGNIFICANT CHANGE UP (ref 150–400)
POTASSIUM SERPL-MCNC: 3.8 MMOL/L — SIGNIFICANT CHANGE UP (ref 3.5–5.3)
POTASSIUM SERPL-SCNC: 3.8 MMOL/L — SIGNIFICANT CHANGE UP (ref 3.5–5.3)
PROT SERPL-MCNC: 7.9 G/DL — SIGNIFICANT CHANGE UP (ref 6–8.3)
PROTHROM AB SERPL-ACNC: 15.4 SEC — HIGH (ref 10.6–13.6)
RBC # BLD: 5.19 M/UL — SIGNIFICANT CHANGE UP (ref 4.2–5.8)
RBC # FLD: 12.7 % — SIGNIFICANT CHANGE UP (ref 10.3–14.5)
SODIUM SERPL-SCNC: 140 MMOL/L — SIGNIFICANT CHANGE UP (ref 135–145)
TROPONIN I SERPL-MCNC: <.015 NG/ML — SIGNIFICANT CHANGE UP (ref 0.01–0.04)
TSH SERPL-MCNC: 0.46 UIU/ML — SIGNIFICANT CHANGE UP (ref 0.36–3.74)
WBC # BLD: 7.11 K/UL — SIGNIFICANT CHANGE UP (ref 3.8–10.5)
WBC # FLD AUTO: 7.11 K/UL — SIGNIFICANT CHANGE UP (ref 3.8–10.5)

## 2021-04-16 PROCEDURE — 85730 THROMBOPLASTIN TIME PARTIAL: CPT

## 2021-04-16 PROCEDURE — 99285 EMERGENCY DEPT VISIT HI MDM: CPT

## 2021-04-16 PROCEDURE — 93010 ELECTROCARDIOGRAM REPORT: CPT

## 2021-04-16 PROCEDURE — 71046 X-RAY EXAM CHEST 2 VIEWS: CPT

## 2021-04-16 PROCEDURE — 80053 COMPREHEN METABOLIC PANEL: CPT

## 2021-04-16 PROCEDURE — 85610 PROTHROMBIN TIME: CPT

## 2021-04-16 PROCEDURE — 85025 COMPLETE CBC W/AUTO DIFF WBC: CPT

## 2021-04-16 PROCEDURE — 85379 FIBRIN DEGRADATION QUANT: CPT

## 2021-04-16 PROCEDURE — 36415 COLL VENOUS BLD VENIPUNCTURE: CPT

## 2021-04-16 PROCEDURE — 93005 ELECTROCARDIOGRAM TRACING: CPT

## 2021-04-16 PROCEDURE — 83880 ASSAY OF NATRIURETIC PEPTIDE: CPT

## 2021-04-16 PROCEDURE — 84443 ASSAY THYROID STIM HORMONE: CPT

## 2021-04-16 PROCEDURE — 84484 ASSAY OF TROPONIN QUANT: CPT

## 2021-04-16 PROCEDURE — 71046 X-RAY EXAM CHEST 2 VIEWS: CPT | Mod: 26

## 2021-04-16 PROCEDURE — 83735 ASSAY OF MAGNESIUM: CPT

## 2021-04-16 PROCEDURE — 99284 EMERGENCY DEPT VISIT MOD MDM: CPT | Mod: 25

## 2021-04-16 RX ORDER — METOPROLOL TARTRATE 50 MG
25 TABLET ORAL ONCE
Refills: 0 | Status: COMPLETED | OUTPATIENT
Start: 2021-04-16 | End: 2021-04-16

## 2021-04-16 RX ORDER — ACETAMINOPHEN 500 MG
650 TABLET ORAL ONCE
Refills: 0 | Status: COMPLETED | OUTPATIENT
Start: 2021-04-16 | End: 2021-04-16

## 2021-04-16 RX ORDER — METOPROLOL TARTRATE 50 MG
1 TABLET ORAL
Qty: 30 | Refills: 0
Start: 2021-04-16 | End: 2021-05-15

## 2021-04-16 RX ADMIN — Medication 25 MILLIGRAM(S): at 17:14

## 2021-04-16 RX ADMIN — Medication 650 MILLIGRAM(S): at 18:09

## 2021-04-16 NOTE — CONSULT NOTE ADULT - SUBJECTIVE AND OBJECTIVE BOX
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 Patient states around noon today his heart started racing. States it traveled up his chest to his throat and he was unable to swallow. Patient also states he felt diaphoretic and hot. Patient did not check his pulse, however states he knows it was fast. Deep breathing did not help his palpitations, however they resolved spontaneously after about 10-15 minutes. Patient currently feels well, denies palpitations, CP, SOB. Patient states these episodes occur randomly and he cannot feel them coming. Patient had a similar episode 2 weeks ago and was seen in the ED for palpitations. Patient states that episode lasted only 5-10 minutes. Patient has since stopped drinking caffeine. Patient follows annually with Dr. Huitron from [facility], states his last appt was in Feb, had a TTE that was reportedly normal. Last cardiac surgery was in . Patient denies ever using Holter Monitor.      PAST MEDICAL & SURGICAL HISTORY:  No pertinent past medical history  Cardiomegaly  Pulmonic regurgitation  Congenital heart disease  Shone complex  Septal defect, atrial  Coarctation of aorta              ECHO  FINDINGS: NA      MEDICATIONS  (STANDING):    MEDICATIONS  (PRN):      FAMILY HISTORY:  Denies Family history of CAD or early MI      Constitutional: denies fever, chills  HEENT: denies blurry vision, difficulty hearing  Respiratory: denies SOB, MARQUEZ, cough  Cardiovascular: denies CP, palpitations, orthopnea, PND, LE edema  Gastrointestinal: denies nausea, vomiting, abdominal pain  Genitourinary: denies urinary changes  Skin: Denies rashes, itching  Neurologic: denies headache, weakness, dizziness  Hematology/Oncology: denies bleeding, easy bruising        SOCIAL HISTORY:  No tobacco, Alcohol or Drug use    Vital Signs Last 24 Hrs  T(C): 36.8 (2021 13:45), Max: 36.8 (2021 13:45)  T(F): 98.3 (2021 13:45), Max: 98.3 (2021 13:45)  HR: 97 (2021 13:45) (97 - 97)  BP: 111/78 (2021 13:45) (111/78 - 111/78)  BP(mean): --  RR: 18 (2021 13:45) (18 - 18)  SpO2: 96% (2021 13:45) (96% - 96%)    Physical Exam:  General: Well developed, well nourished, NAD  HEENT: NCAT, PERRLA, EOMI bl, moist mucous membranes   Neck: Supple, nontender  Neurology: A&Ox3, answering questions appropriately  Respiratory: CTA B/L, No W/R/R  CV: RRR, +S1/S2, no murmurs  Abdominal: Soft, NT, ND +BSx4  Extremities: No C/C/E, + peripheral pulses  MSK: Normal ROM, no joint erythema or warmth, no joint swelling   Heme: No obvious ecchymosis or petechiae   Skin: warm, dry, normal color      ECG: NSR rate 87    I&O's Detail      LABS:                        15.4   7.11  )-----------( 184      ( 2021 14:05 )             45.0                   I&O's Summary    BNP  RADIOLOGY & ADDITIONAL STUDIES:  < from: Xray Chest 2 Views PA/Lat (21 @ 14:14) >    EXAM:  XR CHEST PA LAT 2V                            PROCEDURE DATE:  2021          INTERPRETATION:  PA and lateral chest on 2021 at 2:14 PM. Patient has chest pain.    Heart size is within normal limits.    The lung fields and pleural surfaces are unremarkable.    Chest is similar to  of this year.    IMPRESSION: No acute finding or change.            MAKEDA BEAUCHAMP M.D., ATTENDING RADIOLOGIST  This document has been electronically signed. 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 Patient states around noon today his heart started racing. States it traveled up his chest to his throat and he was unable to swallow. Patient also states he felt diaphoretic and hot. Patient did not check his pulse, however states he knows it was fast. Deep breathing did not help his palpitations, however they resolved spontaneously after about 10-15 minutes. Patient currently feels well, denies palpitations, CP, SOB. Patient states these episodes occur randomly and he cannot feel them coming. Patient had a similar episode 2 weeks ago and was seen in the ED for palpitations. Patient states that episode lasted only 5-10 minutes. Patient has since stopped drinking caffeine. Patient follows annually with Dr. Huitron from [facility], states his last appt was in Feb, had a TTE that was reportedly normal. Last cardiac surgery was in . Patient denies ever using Holter Monitor.      PAST MEDICAL & SURGICAL HISTORY:  No pertinent past medical history  Cardiomegaly  Pulmonic regurgitation  Congenital heart disease  Shone complex  Septal defect, atrial  Coarctation of aorta              ECHO  FINDINGS: NA      MEDICATIONS  (STANDING):    MEDICATIONS  (PRN):      FAMILY HISTORY:  Denies Family history of CAD or early MI      Constitutional: denies fever, chills  HEENT: denies blurry vision, difficulty hearing  Respiratory: denies SOB, MARQUEZ, cough  Cardiovascular: denies CP, palpitations, orthopnea, PND, LE edema  Gastrointestinal: denies nausea, vomiting, abdominal pain  Genitourinary: denies urinary changes  Skin: Denies rashes, itching  Neurologic: denies headache, weakness, dizziness  Hematology/Oncology: denies bleeding, easy bruising        SOCIAL HISTORY:  No tobacco, Alcohol or Drug use    Vital Signs Last 24 Hrs  T(C): 36.8 (2021 13:45), Max: 36.8 (2021 13:45)  T(F): 98.3 (2021 13:45), Max: 98.3 (2021 13:45)  HR: 97 (2021 13:45) (97 - 97)  BP: 111/78 (2021 13:45) (111/78 - 111/78)  BP(mean): --  RR: 18 (2021 13:45) (18 - 18)  SpO2: 96% (2021 13:45) (96% - 96%)    Physical Exam:  General: Well developed, well nourished, NAD  HEENT: NCAT, PERRLA, EOMI bl, moist mucous membranes   Neck: Supple, nontender  Neurology: A&Ox3, answering questions appropriately  Respiratory: CTA B/L, No W/R/R  CV: RRR, +S1/S2, no murmurs  Abdominal: Soft, NT, ND +BSx4  Extremities: No C/C/E, + peripheral pulses  MSK: Normal ROM, no joint erythema or warmth, no joint swelling   Heme: No obvious ecchymosis or petechiae   Skin: warm, dry, normal color      ECG: NSR rate 87    I&O's Detail      LABS:                        15.4   7.11  )-----------( 184      ( 2021 14:05 )             45.0                   I&O's Summary    BNP  RADIOLOGY & ADDITIONAL STUDIES:  < from: Xray Chest 2 Views PA/Lat (21 @ 14:14) >    EXAM:  XR CHEST PA LAT 2V                            PROCEDURE DATE:  2021          INTERPRETATION:  PA and lateral chest on 2021 at 2:14 PM. Patient has chest pain.    Heart size is within normal limits.    The lung fields and pleural surfaces are unremarkable.    Chest is similar to  of this year.    IMPRESSION: No acute finding or change.            MAKEDA BEAUCHAMP M.D., ATTENDING RADIOLOGIST  This document has been electronically signed. 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 Patient states around noon today his heart started racing. States it traveled up his chest to his throat and he was unable to swallow. Patient also states he felt diaphoretic and hot. Patient did not check his pulse, however states he knows it was fast. Deep breathing did not help his palpitations, however they resolved spontaneously after about 10-15 minutes. Patient currently feels well, denies palpitations, CP, SOB. Patient states these episodes occur randomly and he cannot feel them coming. Patient had a similar episode 2 weeks ago and was seen in the ED for palpitations. Patient states that episode lasted only 5-10 minutes. Patient has since stopped drinking caffeine. Patient follows annually with Dr. Huitron from [facility], states his last appt was in Feb, had a TTE that was reportedly normal. Last cardiac surgery was in . Patient denies ever using Holter Monitor.      PAST MEDICAL & SURGICAL HISTORY:  No pertinent past medical history  Cardiomegaly  Pulmonic regurgitation  Congenital heart disease  Shone complex  Septal defect, atrial  Coarctation of aorta         ECHO  FINDINGS: NA      MEDICATIONS  (STANDING):    MEDICATIONS  (PRN):      FAMILY HISTORY:  Denies Family history of CAD or early MI      Constitutional: denies fever, chills  HEENT: denies blurry vision, difficulty hearing  Respiratory: denies SOB, MARQUEZ, cough  Cardiovascular: denies CP, palpitations, orthopnea, PND, LE edema  Gastrointestinal: denies nausea, vomiting, abdominal pain  Genitourinary: denies urinary changes  Skin: Denies rashes, itching  Neurologic: denies headache, weakness, dizziness  Hematology/Oncology: denies bleeding, easy bruising        SOCIAL HISTORY:  No tobacco, Alcohol or Drug use    Vital Signs Last 24 Hrs  T(C): 36.8 (2021 13:45), Max: 36.8 (2021 13:45)  T(F): 98.3 (2021 13:45), Max: 98.3 (2021 13:45)  HR: 97 (2021 13:45) (97 - 97)  BP: 111/78 (2021 13:45) (111/78 - 111/78)  BP(mean): --  RR: 18 (2021 13:45) (18 - 18)  SpO2: 96% (2021 13:45) (96% - 96%)    Physical Exam:  General: Well developed, well nourished, NAD  HEENT: NCAT, PERRLA, EOMI bl, moist mucous membranes   Neck: Supple, nontender  Neurology: A&Ox3, answering questions appropriately  Respiratory: CTA B/L, No W/R/R  CV: RRR, +S1/S2, 1-2/6 SM  Abdominal: Soft, NT, ND +BSx4  Extremities: No C/C/E, + peripheral pulses  MSK: Normal ROM, no joint erythema or warmth, no joint swelling   Heme: No obvious ecchymosis or petechiae   Skin: warm, dry, normal color      ECG: NSR rate 87    I&O's Detail      LABS:                        15.4   7.11  )-----------( 184      ( 2021 14:05 )             45.0                   I&O's Summary    BNP  RADIOLOGY & ADDITIONAL STUDIES:  < from: Xray Chest 2 Views PA/Lat (21 @ 14:14) >    EXAM:  XR CHEST PA LAT 2V                            PROCEDURE DATE:  2021          INTERPRETATION:  PA and lateral chest on 2021 at 2:14 PM. Patient has chest pain.    Heart size is within normal limits.    The lung fields and pleural surfaces are unremarkable.    Chest is similar to  of this year.    IMPRESSION: No acute finding or change.            MAKEDA BEAUCHAMP M.D., ATTENDING RADIOLOGIST  This document has been electronically signed. 2021  2:28PM    < end of copied text >

## 2021-04-16 NOTE — ED PROVIDER NOTE - PATIENT PORTAL LINK FT
You can access the FollowMyHealth Patient Portal offered by [facility] [place] by registering at the following website: [email or web address]. By joining [employer]’s FollowMyHealth portal, you will also be able to view your health information using other applications (apps) compatible with our system.

## 2021-04-16 NOTE — ED PROVIDER NOTE - PROGRESS NOTE DETAILS
Reevaluated patient at bedside.  Patient reports no episodes of palpitations since arrival to ED. Cardiac monitor reviewed and pt remained in NSR with no ectopy during ED visit .  Discussed the results of all diagnostic testing in ED and copies of all reports given.   An opportunity to ask questions was given.  Discussed the importance of prompt, close medical follow-up.  Patient will return with any changes, concerns or persistent / worsening symptoms.  Understanding of all instructions verbalized. seen by cardiology dr fallon salvador. there is consideration for atrial tachycardia secondary to scar tissue built up around the patch repair done at age 12.  with no arrhythmia here during monitoring, recommendation is to admin 25 mg metoprolol, if tolerated to dc with once daily and have him continue with his scheduled visith with his congenital cardiologist. pt tolerated metoprolol well, will d/c home. All instructions reviewed, pt understands follow up. Advised to return to ED for any new or worsening symptoms pt tolerated metoprolol well, reports Dr Fady RAPP (cardiology) aware, tylenol given will d/c home. All instructions reviewed, pt understands follow up. Advised to return to ED for any new or worsening symptoms

## 2021-04-16 NOTE — ED ADULT NURSE NOTE - OBJECTIVE STATEMENT
Received Pt awake and alert, c/o episode of palpitations that last about 10 minutes, during that time Pt sts he could not swallow. Had a similar episode 2 weeks ago, was in ER and all tests were negative. Pt currently sitting on stretcher comfortable, no complaints of palpitations or difficulty swallowing at this time, significant other at bedside, respirations even an unlabored, will continue  to monitor closely.

## 2021-04-16 NOTE — ED PROVIDER NOTE - OBJECTIVE STATEMENT
21 y/o M with PMH congenital heart disease with hx coartation of aorta and ASD with surgical repair in infancy and revision surgeyr in 2012 presents to ED for c/o palpitations. States at 12:20 felt heart rate go very fast, aslo felt dizzy at this time. No chest pain or pressure. Was associated with SOB, This lasted about 20 min and resolved spontaneously. This occurred about 2 weeks ago and was seen in this ED on April 1. Was lissett by cardiology Dr Mcginnis and  has out patinet follow up with cardiology dr Frankie Stallings next week. patient is not on any meds.  Denies drug or alcohol use. Does not smoke. Denies caffein use. No calf pain swelling or tenderness.

## 2021-04-16 NOTE — CONSULT NOTE ADULT - ASSESSMENT
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 Patient was seen in ED on  with similar complaints.     -EKG showing NSR, rate 87  -Patient hemodynamically stable, /78  -TSH 0.64, WNL at visit    -Patient will likely need outpatient Holter Monitor to detect underlying arrhythmias  -Counseled patient on how to take his pulse when these episodes occur so he can measure how fast his heart is beating  -Monitor on tele, if no signs of acute arrhythmia patient is optimized for d/c with close outpatient follow up with Dr. 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 Patient was seen in ED on  with similar complaints.     -EKG showing NSR, rate 87  -Patient hemodynamically stable, /78  -TSH 0.64, WNL at visit    -Likely 2/2 pAT around patch  -Recommend Metoprolol succinate 25mg qd to prevent further episodes of palpitations until follow up appointment with Dr. Huitron on 21  -Patient will likely need outpatient Holter Monitor for detection of underlying arrhythmias  -Counseled patient on how to take his pulse when these episodes occur so he can measure how fast his heart is beating  -If patient remains asymptomatic, patient is optimized for d/c with close outpatient follow up with Dr. Huitron  21 y/o M with Hx Shone's Complex consisting of coarctation of aorta, ASD, PVR s/p uninterrupted repair of the aortic arch, s/p subaortic stenosis repair, s/p patch closure of ASD, and s/p pulmonary valvuloplasty as a  with a reop in  (unknown procedure) presents to ED with palpitations that have since resolved. Patient was seen in ED on  with similar complaints.     -EKG showing NSR, rate 87  -Patient hemodynamically stable, /78  -TSH 0.64, WNL at visit    - possibly with PSVT given hx of atrial patch.   -Recommend Metoprolol succinate 25mg qd to prevent further episodes of palpitations until follow up appointment with Dr. Huitron on 21  -Patient will likely need outpatient Holter Monitor for detection of underlying arrhythmias  -Counseled patient on how to take his pulse when these episodes occur so he can measure how fast his heart is beating  -If patient remains asymptomatic, patient is optimized for d/c with close outpatient follow up with Dr. Huitron

## 2021-04-16 NOTE — CONSULT NOTE ADULT - ATTENDING COMMENTS
I was physically present for the key portions of the evaluation and management (E/M) service provided.  I agree with the above history, physical, and plan, which I have reviewed and edited where appropriate.     pt with intermittent palp that has a likely arrhythmic given nature and hx of cardiac repair. will start on toprol 25mg Qday till can see outpt cards. will need extended monitoring. advise to return to ed if worsens.

## 2021-04-16 NOTE — ED PROVIDER NOTE - CARE PROVIDER_API CALL
CONGESTION/FEVER/COUGH
Brian Shrestha)  Internal Medicine  [address], [place] [id number]  Phone: [phone]  Fax: [phone]  Follow Up Time: 1-3 Days

## 2021-04-16 NOTE — ED PROVIDER NOTE - NSFOLLOWUPINSTRUCTIONS_ED_ALL_ED_FT
1. Rest, STAY HYDATED, AVOID CAFFEINE AND ALCOHOL  2. FOLLOW UP WITH __CARDIOLOGY________ AS DIRECTED.  YOU WERE GIVEN COPIES OF ALL LABS AND IMAGING RESULTS FROM YOUR ER VISIT--PLEASE TAKE THEM WITH YOU TO YOUR APPOINTMENT.  3. IF NEEDED, CALL [phone] TO FIND A PRIMARY CARE PHYSICIAN.  OR CALL [phone] TO MAKE AN APPOINTMENT WITH THE MEDICAL CLINIC.  4. RETURN TO THE ER FOR ANY WORSENING SYMPTOMS.      Palpitations    A palpitation is the feeling that your heartbeat is irregular or is faster than normal. It may feel like your heart is fluttering or skipping a beat. They may be caused by many things, including smoking, caffeine, alcohol, stress, and certain medicines. Although most causes of palpitations are not serious, palpitations can be a sign of a serious medical problem. Avoid caffeine, alcohol, and tobacco products at home. Try to reduce stress and anxiety and make sure to get plenty of rest.     SEEK IMMEDIATE MEDICAL CARE IF YOU HAVE ANY OF THE FOLLOWING SYMPTOMS: chest pain, shortness of breath, severe headache, dizziness/lightheadedness, or fainting. Start Metoprolol xr 25 mg 1 tablet once a day    1. Rest, STAY HYDATED, AVOID CAFFEINE AND ALCOHOL  2. FOLLOW UP WITH __CARDIOLOGY________ AS DIRECTED.  YOU WERE GIVEN COPIES OF ALL LABS AND IMAGING RESULTS FROM YOUR ER VISIT--PLEASE TAKE THEM WITH YOU TO YOUR APPOINTMENT.  3. IF NEEDED, CALL [phone] TO FIND A PRIMARY CARE PHYSICIAN.  OR CALL [phone] TO MAKE AN APPOINTMENT WITH THE MEDICAL CLINIC.  4. RETURN TO THE ER FOR ANY WORSENING SYMPTOMS.      Palpitations    A palpitation is the feeling that your heartbeat is irregular or is faster than normal. It may feel like your heart is fluttering or skipping a beat. They may be caused by many things, including smoking, caffeine, alcohol, stress, and certain medicines. Although most causes of palpitations are not serious, palpitations can be a sign of a serious medical problem. Avoid caffeine, alcohol, and tobacco products at home. Try to reduce stress and anxiety and make sure to get plenty of rest.     SEEK IMMEDIATE MEDICAL CARE IF YOU HAVE ANY OF THE FOLLOWING SYMPTOMS: chest pain, shortness of breath, severe headache, dizziness/lightheadedness, or fainting.

## 2021-04-16 NOTE — ED PROVIDER NOTE - ATTENDING CONTRIBUTION TO CARE
21 yo male who has hx of congenital heart disease sp multiple surgeries none recently has been having bouts of palpitations with sensation of fullness in his throat that were on and then off. very transient  he is following with a caridologist but sx became severe today so he came to er for eval  now feeling worried but otherwise well.  on exam: wd wn male nad  heent normal no thyromegaly  cor rrr marie  chest cta  abd soft nt nd no hsm no guard no rebound no cvat  ext normal  skin normal no clubbing noted  neuro normal  plan cardiac monitor lytes ekg cards contulstation xray chest iv fluids

## 2021-04-28 ENCOUNTER — APPOINTMENT (OUTPATIENT)
Dept: INTERNAL MEDICINE | Facility: CLINIC | Age: 21
End: 2021-04-28

## 2021-06-13 NOTE — ED ADULT TRIAGE NOTE - WEIGHT IN KG
Remote device check.  Please see link for full device report.  Patient was informed of results and next follow up via mail.     81.6

## 2021-06-16 NOTE — ED PROVIDER NOTE - TEMPLATE, MLM
INR 2.1 INR stable. Discussed continuing management of dose of Warfarin and returning in one month . No changes to diet needed at this time. Continue moderate intake of Vitamin K and call if increase bruising or unexplained bleeding. Call with medication changes or upcoming procedures.    
Cardiac

## 2024-08-28 NOTE — ED PROVIDER NOTE - CLINICAL SUMMARY MEDICAL DECISION MAKING FREE TEXT BOX
Patient called to request a refill for  amoxicillin-clavulanate (AUGMENTIN) 875-125 mg per tablet         19 y/o M with hx congential heart disease presents with palpitations no chest pain, all symptoms resolved now, was seen here 2 weeks ago for same has outpatinet cardiac follow up plan= labs EKG cardio consult will likely need out pt cardiology follow up and possible holter monitor

## 2025-03-31 NOTE — ED PROVIDER NOTE - NS ED MD DISPO DISCHARGE CCDA
This visit is being performed virtually via Video visit using [employer].   Clinical Location: [facility] location Home and is physically present in   the [facility] at the time of this visit.       Date of Service:  3/31/2025    Chief Complaint:  ED    History of Present Illness: Patient returns via video visit states tadalafil 10 mg daily continues to work excellent and is very pleased would like to continue he has no side effects.  He is also here to review his yearly PSA.  He denies any other new urologic complaints specifically denies any dysuria or hematuria.      PSA Test Results:  Lab Results   Component Value Date    PSA 0.88 2025    PSA 0.62 2024    PSA 0.59 10/12/2020       Past Medical History:   Past Medical History:   Diagnosis Date    Anxiety     ED (erectile dysfunction)     Insomnia, unspecified     Pancreatitis (CMD)        Surgical History:  Past Surgical History:   Procedure Laterality Date    Adenoidectomy      Neck surgery      Spine surgery      Tonsillectomy     :    Family History:  Family history of genitourinary malignancy -see below  Family History   Problem Relation Age of Onset    Cancer Mother         leukemia    COPD Father     Diabetes Father     Patient is unaware of any medical problems Sister     Patient is unaware of any medical problems Brother        Social History:  Social History     Socioeconomic History    Marital status:      Spouse name: Not on file    Number of children: Not on file    Years of education: Not on file    Highest education level: Not on file   Occupational History    Not on file   Tobacco Use    Smoking status: Former     Current packs/day: 0.00     Average packs/day: 0.5 packs/day for 20.0 years (10.0 ttl pk-yrs)     Types: Cigarettes     Start date: 1998     Quit date: 2018     Years since quittin.2    Smokeless tobacco: Never   Substance and Sexual Activity    Alcohol use: Yes      Comment: michelle     Drug use: Not on file    Sexual activity: Not on file   Other Topics Concern    Not on file   Social History Narrative    Not on file     Social Determinants of Health     Financial Resource Strain: Not on file   Food Insecurity: Not on file   Transportation Needs: Not on file   Physical Activity: Not on file   Stress: Not on file   Social Connections: Not on file   Interpersonal Safety: Not on file       Allergies:  ALLERGIES:  No Known Allergies    Medications:  Current Outpatient Medications   Medication Sig Dispense Refill    cyclobenzaprine (FLEXERIL) 10 MG tablet Take 1 tablet by mouth 3 times daily as needed for Muscle spasms. 30 tablet 0    tadalafil (CIALIS) 10 MG tablet Take 1 tablet by mouth daily. FOLLOW UP FOR FURTHER REFILLS 90 tablet 0    citalopram (CeleXA) 10 MG tablet Take 1 tablet by mouth once daily 90 tablet 1    ketoconazole (NIZORAL) 2 % shampoo Lather on affected area in shower. Leave on 1-2 min, rinse. Use daily 120 mL 11    albuterol 108 (90 Base) MCG/ACT inhaler Inhale 2 puffs into the lungs every 4 hours as needed for Shortness of Breath or Wheezing. 1 Inhaler 5    Multiple Vitamins-Minerals (MULTIVITAMIN ADULT PO) Take 1 tablet by mouth daily.       No current facility-administered medications for this visit.       Review of Systems:       Complains of ED.  General:  Patient denies fever, chills, night sweats, excessive fatigue, anorexia, weight loss, weakness, or hot flashes.   Eyes:  Patient denies blurred vision, double vision, pain, burning and itching.  Neurologic:  Patient denies headache, dizziness, numbness, loss of balance or insomnia.  Gastrointestinal:  Patient denies nausea, vomiting, diarrhea, constipation, blood in stool, or indigestion/heartburn.  Cardiovascular:  Patient denies chest pain or palpitations.  Skin:  Patient denies skin rashes, bruising, or persistent itching.  Musculoskeletal:  Patient denies joint pain, bone pain, leg and ankle  swelling.  ENT/Mouth:  Patient denies dysphagia, dry mouth, sore throat, sores on tongue, mouth sores, sinus drainage, or hearing loss.  Respiratory:  Patient denies wheezing, frequent cough, or shortness of breath.  Psych:  Patient denies anxiety or depression.      Physical Exam:  Constitutional:  There were no vitals taken for this visit.  Well developed, well nourished, and afebrile.    Psychiatric:  Alert and oriented x3.  Normal mood and affect.  Remainder of physical not completed due to video nature of visit  Record Review:    I met with patient, Yusef Gómez, and  obtained pertinent history.    Reviewed tests:  Lab tests reviewed    The records were reviewed and in summary: See PSA as above.  Glycohemoglobin is 5.2 comp panel with glucose of 102 creatinine of 1.00 ALT is 39 hemoglobin is 15.4 hematocrit is 43.6  Assessment and Plan:    ED-will continue tadalafil 10 daily and the prescription will be written.  Prostate screening this was a video visit today as we were only able to review PSA trend but it is reasonable he will recheck in 1 year with a PSA and DUANE if face-to-face  -New prescription for tadalafil 10 mg daily  -Recheck 1 year with PSA                    Patient/Caregiver provided printed discharge information.

## 2025-04-23 ENCOUNTER — TRANSCRIPTION ENCOUNTER (OUTPATIENT)
Age: 25
End: 2025-04-23

## 2025-06-06 ENCOUNTER — EMERGENCY (EMERGENCY)
Facility: HOSPITAL | Age: 25
LOS: 1 days | End: 2025-06-06
Attending: EMERGENCY MEDICINE | Admitting: EMERGENCY MEDICINE
Payer: COMMERCIAL

## 2025-06-06 VITALS
HEART RATE: 79 BPM | OXYGEN SATURATION: 98 % | RESPIRATION RATE: 18 BRPM | TEMPERATURE: 99 F | DIASTOLIC BLOOD PRESSURE: 77 MMHG | HEIGHT: 73 IN | WEIGHT: 240.08 LBS | SYSTOLIC BLOOD PRESSURE: 135 MMHG

## 2025-06-06 LAB
ALBUMIN SERPL ELPH-MCNC: 3.7 G/DL — SIGNIFICANT CHANGE UP (ref 3.3–5)
ALP SERPL-CCNC: 70 U/L — SIGNIFICANT CHANGE UP (ref 40–120)
ALT FLD-CCNC: 22 U/L — SIGNIFICANT CHANGE UP (ref 12–78)
ANION GAP SERPL CALC-SCNC: 6 MMOL/L — SIGNIFICANT CHANGE UP (ref 5–17)
AST SERPL-CCNC: 21 U/L — SIGNIFICANT CHANGE UP (ref 15–37)
BASOPHILS # BLD AUTO: 0.03 K/UL — SIGNIFICANT CHANGE UP (ref 0–0.2)
BASOPHILS NFR BLD AUTO: 0.3 % — SIGNIFICANT CHANGE UP (ref 0–2)
BILIRUB SERPL-MCNC: 0.4 MG/DL — SIGNIFICANT CHANGE UP (ref 0.2–1.2)
BUN SERPL-MCNC: 17 MG/DL — SIGNIFICANT CHANGE UP (ref 7–23)
CALCIUM SERPL-MCNC: 8.5 MG/DL — SIGNIFICANT CHANGE UP (ref 8.5–10.1)
CHLORIDE SERPL-SCNC: 102 MMOL/L — SIGNIFICANT CHANGE UP (ref 96–108)
CO2 SERPL-SCNC: 29 MMOL/L — SIGNIFICANT CHANGE UP (ref 22–31)
CREAT SERPL-MCNC: 0.99 MG/DL — SIGNIFICANT CHANGE UP (ref 0.5–1.3)
EGFR: 109 ML/MIN/1.73M2 — SIGNIFICANT CHANGE UP
EGFR: 109 ML/MIN/1.73M2 — SIGNIFICANT CHANGE UP
EOSINOPHIL # BLD AUTO: 0.3 K/UL — SIGNIFICANT CHANGE UP (ref 0–0.5)
EOSINOPHIL NFR BLD AUTO: 3.3 % — SIGNIFICANT CHANGE UP (ref 0–6)
GLUCOSE SERPL-MCNC: 94 MG/DL — SIGNIFICANT CHANGE UP (ref 70–99)
HCT VFR BLD CALC: 42.4 % — SIGNIFICANT CHANGE UP (ref 39–50)
HGB BLD-MCNC: 14.2 G/DL — SIGNIFICANT CHANGE UP (ref 13–17)
IMM GRANULOCYTES NFR BLD AUTO: 0.4 % — SIGNIFICANT CHANGE UP (ref 0–0.9)
LYMPHOCYTES # BLD AUTO: 1.65 K/UL — SIGNIFICANT CHANGE UP (ref 1–3.3)
LYMPHOCYTES # BLD AUTO: 18.4 % — SIGNIFICANT CHANGE UP (ref 13–44)
MCHC RBC-ENTMCNC: 29.1 PG — SIGNIFICANT CHANGE UP (ref 27–34)
MCHC RBC-ENTMCNC: 33.5 G/DL — SIGNIFICANT CHANGE UP (ref 32–36)
MCV RBC AUTO: 86.9 FL — SIGNIFICANT CHANGE UP (ref 80–100)
MONOCYTES # BLD AUTO: 0.68 K/UL — SIGNIFICANT CHANGE UP (ref 0–0.9)
MONOCYTES NFR BLD AUTO: 7.6 % — SIGNIFICANT CHANGE UP (ref 2–14)
NEUTROPHILS # BLD AUTO: 6.28 K/UL — SIGNIFICANT CHANGE UP (ref 1.8–7.4)
NEUTROPHILS NFR BLD AUTO: 70 % — SIGNIFICANT CHANGE UP (ref 43–77)
NRBC BLD AUTO-RTO: 0 /100 WBCS — SIGNIFICANT CHANGE UP (ref 0–0)
PLATELET # BLD AUTO: 201 K/UL — SIGNIFICANT CHANGE UP (ref 150–400)
POTASSIUM SERPL-MCNC: 3.7 MMOL/L — SIGNIFICANT CHANGE UP (ref 3.5–5.3)
POTASSIUM SERPL-SCNC: 3.7 MMOL/L — SIGNIFICANT CHANGE UP (ref 3.5–5.3)
PROT SERPL-MCNC: 7.9 G/DL — SIGNIFICANT CHANGE UP (ref 6–8.3)
RBC # BLD: 4.88 M/UL — SIGNIFICANT CHANGE UP (ref 4.2–5.8)
RBC # FLD: 13.3 % — SIGNIFICANT CHANGE UP (ref 10.3–14.5)
SODIUM SERPL-SCNC: 137 MMOL/L — SIGNIFICANT CHANGE UP (ref 135–145)
TROPONIN I, HIGH SENSITIVITY RESULT: 4 NG/L — SIGNIFICANT CHANGE UP
WBC # BLD: 8.98 K/UL — SIGNIFICANT CHANGE UP (ref 3.8–10.5)
WBC # FLD AUTO: 8.98 K/UL — SIGNIFICANT CHANGE UP (ref 3.8–10.5)

## 2025-06-06 PROCEDURE — 70450 CT HEAD/BRAIN W/O DYE: CPT | Mod: 26,59

## 2025-06-06 PROCEDURE — 70496 CT ANGIOGRAPHY HEAD: CPT

## 2025-06-06 PROCEDURE — 84484 ASSAY OF TROPONIN QUANT: CPT

## 2025-06-06 PROCEDURE — 80053 COMPREHEN METABOLIC PANEL: CPT

## 2025-06-06 PROCEDURE — 70450 CT HEAD/BRAIN W/O DYE: CPT

## 2025-06-06 PROCEDURE — 85025 COMPLETE CBC W/AUTO DIFF WBC: CPT

## 2025-06-06 PROCEDURE — 70498 CT ANGIOGRAPHY NECK: CPT | Mod: 26

## 2025-06-06 PROCEDURE — 36000 PLACE NEEDLE IN VEIN: CPT | Mod: XU

## 2025-06-06 PROCEDURE — 70496 CT ANGIOGRAPHY HEAD: CPT | Mod: 26

## 2025-06-06 PROCEDURE — 99285 EMERGENCY DEPT VISIT HI MDM: CPT

## 2025-06-06 PROCEDURE — 93005 ELECTROCARDIOGRAM TRACING: CPT

## 2025-06-06 PROCEDURE — 93010 ELECTROCARDIOGRAM REPORT: CPT

## 2025-06-06 PROCEDURE — 36415 COLL VENOUS BLD VENIPUNCTURE: CPT

## 2025-06-06 PROCEDURE — 99285 EMERGENCY DEPT VISIT HI MDM: CPT | Mod: 25

## 2025-06-06 PROCEDURE — 70498 CT ANGIOGRAPHY NECK: CPT

## 2025-06-06 RX ADMIN — Medication 2000 MILLILITER(S): at 22:02

## 2025-06-06 NOTE — ED PROVIDER NOTE - NSFOLLOWUPINSTRUCTIONS_ED_ALL_ED_FT
1) Follow-up with your Primary Medical Doctor and your neurosurgeon. Call today / next business day for prompt follow-up.  2) Return to Emergency room for any worsening or persistent pain, weakness, fever, or any other concerning symptoms.  3) See attached instruction sheets for additional information, including information regarding signs and symptoms to look out for, reasons to seek immediate care and other important instructions.  4) Follow-up with any specialists as discussed / noted as well.

## 2025-06-06 NOTE — ED ADULT NURSE NOTE - NSFALLUNIVINTERV_ED_ALL_ED
Bed/Stretcher in lowest position, wheels locked, appropriate side rails in place/Call bell, personal items and telephone in reach/Instruct patient to call for assistance before getting out of bed/chair/stretcher/Non-slip footwear applied when patient is off stretcher/[place] to call system/Physically safe environment - no spills, clutter or unnecessary equipment/Purposeful proactive rounding/Room/bathroom lighting operational, light cord in reach

## 2025-06-06 NOTE — ED PROVIDER NOTE - CLINICAL SUMMARY MEDICAL DECISION MAKING FREE TEXT BOX
24-year-old male history of brain aneurysm status post stent 2 years ago complaining of intermittent episodes of fatigue and dizziness for the past 5 days.  Denies any chest pain shortness of breath.  Denies any headache nausea vomiting visual changes.  Currently feeling better here in the ED.    Physical exam: Well-appearing no acute distress clear to auscultation bilaterally regular rate and rhythm abdomen soft nontender nondistended.  Motor neuro intact.    Labs within normal limits EKG normal sinus rhythm CT CTA head and neck showing inadequate contrast bolus opacification and motion artifact.  No evidence of intracranial aneurysm or proximal vessel occlusion.  Patient currently asymptomatic NIH scale stroke 0.  Will follow-up with his neurosurgeon Dr. Souza at [facility] for outpatient MRI/MRA.  Understands return if any worsening symptoms.

## 2025-06-06 NOTE — ED ADULT TRIAGE NOTE - CHIEF COMPLAINT QUOTE
Presents to ED with complaints of Fatigue and dizziness x 1 week. Hx of brain stents, brain aneurism, denies headache,

## 2025-06-06 NOTE — ED ADULT NURSE NOTE - OBJECTIVE STATEMENT
Pt states he has been having weakness and fatigue X 1 week, no CP at this time or during fatigue episodes. Pt alert, oriented not SOB, able to talk in complete sentences. pt takes metoprolol for palpitations,  IV placed to R AC20, labs sent, pt on ekg monitor

## 2025-06-06 NOTE — ED ADULT NURSE NOTE - CAS TRG GENERAL NORM CIRC DET
Render Risk Assessment In Note?: no
Additional Notes: Make same day appointment next time rash appears
Detail Level: Simple
Additional Notes: Resolving ecchymoses at this point, unclear of primary etiology
Strong peripheral pulses

## 2025-06-06 NOTE — ED PROVIDER NOTE - PROGRESS NOTE DETAILS
NYLA Ayala: EKG interpretation states acute MI/STEMI, however on review of EKG there is no ST elevations or signs of a STEMI.  EKG was sent to interventional cardiologist Dr. Kitchen whom agrees EKG does not show a STEMI.  Rpt EKG pending

## 2025-06-06 NOTE — ED PROVIDER NOTE - PATIENT PORTAL LINK FT
You can access the FollowMyHealth Patient Portal offered by [facility] by registering at the following website: [email or web address]. By joining [facility]’s FollowMyHealth portal, you will also be able to view your health information using other applications (apps) compatible with our system.

## 2025-06-06 NOTE — ED PROVIDER NOTE - NSICDXPASTMEDICALHX_GEN_ALL_CORE_FT
PAST MEDICAL HISTORY:  Cardiomegaly     Coarctation of aorta     Congenital heart disease     No pertinent past medical history     Pulmonic regurgitation     Septal defect, atrial     Shone complex

## 2025-06-07 VITALS
TEMPERATURE: 98 F | HEART RATE: 82 BPM | SYSTOLIC BLOOD PRESSURE: 100 MMHG | DIASTOLIC BLOOD PRESSURE: 69 MMHG | OXYGEN SATURATION: 99 % | RESPIRATION RATE: 18 BRPM

## 2025-06-10 ENCOUNTER — TRANSCRIPTION ENCOUNTER (OUTPATIENT)
Age: 25
End: 2025-06-10

## 2025-08-13 ENCOUNTER — TRANSCRIPTION ENCOUNTER (OUTPATIENT)
Age: 25
End: 2025-08-13

## 2025-09-17 ENCOUNTER — NON-APPOINTMENT (OUTPATIENT)
Age: 25
End: 2025-09-17